# Patient Record
Sex: FEMALE | URBAN - METROPOLITAN AREA
[De-identification: names, ages, dates, MRNs, and addresses within clinical notes are randomized per-mention and may not be internally consistent; named-entity substitution may affect disease eponyms.]

---

## 2021-02-24 ENCOUNTER — OFFICE VISIT (OUTPATIENT)
Dept: URBAN - METROPOLITAN AREA CLINIC 91 | Facility: CLINIC | Age: 76
End: 2021-02-24
Payer: MEDICARE

## 2021-02-24 DIAGNOSIS — H04.123 DRY EYE SYNDROME OF BILATERAL LACRIMAL GLANDS: ICD-10-CM

## 2021-02-24 DIAGNOSIS — H43.811 VITREOUS DEGENERATION, RIGHT EYE: ICD-10-CM

## 2021-02-24 DIAGNOSIS — H26.493 OTHER SECONDARY CATARACT, BILATERAL: ICD-10-CM

## 2021-02-24 DIAGNOSIS — E11.3293 TYPE 2 DIAB WITH MILD NONP RTNOP WITHOUT MACULAR EDEMA, BI: Primary | ICD-10-CM

## 2021-02-24 DIAGNOSIS — H35.371 PUCKERING OF MACULA, RIGHT EYE: ICD-10-CM

## 2021-02-24 DIAGNOSIS — H02.831 DERMATOCHALASIS OF RIGHT UPPER EYELID: ICD-10-CM

## 2021-02-24 DIAGNOSIS — H02.834 DERMATOCHALASIS OF LEFT UPPER EYELID: ICD-10-CM

## 2021-02-24 PROCEDURE — 99214 OFFICE O/P EST MOD 30 MIN: CPT | Performed by: OPHTHALMOLOGY

## 2021-02-24 RX ORDER — ATENOLOL 100 MG/1
100 MG TABLET ORAL
Refills: 0 | Status: ACTIVE
Start: 2021-02-24

## 2021-02-24 RX ORDER — METFORMIN HYDROCHLORIDE 500 MG/1
500 MG TABLET, FILM COATED ORAL
Refills: 0 | Status: ACTIVE
Start: 2021-02-24

## 2021-02-24 RX ORDER — AMLODIPINE BESYLATE 5 MG/1
5 MG TABLET ORAL
Qty: 0 | Refills: 0 | Status: ACTIVE
Start: 2021-02-24

## 2021-02-24 RX ORDER — SIMVASTATIN 10 MG/1
10 MG TABLET, FILM COATED ORAL
Refills: 0 | Status: ACTIVE
Start: 2021-02-24

## 2021-02-24 RX ORDER — GLIPIZIDE 2.5 MG/1
2.5 MG TABLET, FILM COATED, EXTENDED RELEASE ORAL
Qty: 0 | Refills: 0 | Status: ACTIVE
Start: 2021-02-24

## 2021-02-24 ASSESSMENT — INTRAOCULAR PRESSURE
OS: 14
OD: 14

## 2021-02-24 NOTE — IMPRESSION/PLAN
Impression: Dermatochalasis of right upper eyelid: H02.831. Plan: see notes above. Patient to switch to lopressor 50 mg bid for 1 month, while atenolol is out of stock. New Rx sent. Thanks.

## 2021-02-24 NOTE — IMPRESSION/PLAN
Impression: Vitreous degeneration, right eye: H43.981. Plan: For posterior vitreous detachment, I have explained signs and symptoms associated with retinal tear or detachment. Patient was advised to return ASAP if they notice an increase or change in floaters, change or decrease in vision or a veil or curtain coming down over their vision. I have explained the risk of vision loss, and possible need for retinal detachment repair in detail. Patient was also provided with an informational pamphlet.

## 2021-02-24 NOTE — IMPRESSION/PLAN
Impression: Puckering of macula, right eye: H35.371. Plan: I discussed epiretinal membrane in right eye. I will obtain baseline testing at this time. Patient advised to monitor vision closely, and to call our office immediately if change is noted.

## 2021-02-24 NOTE — IMPRESSION/PLAN
Impression: Dermatochalasis of left upper eyelid: H02.834. Plan: Drooping upper eyelids were discussed with patient. Patient would like to proceed.

## 2021-02-24 NOTE — IMPRESSION/PLAN
Impression: Type 2 diab with mild nonp rtnop without macular edema, bi: L34.2921. Plan: I have explained to patient that there is evidence of mild diabetic retinopathy in both eyes. I have stressed the importance of patient maintaining good blood sugar control, and patient understands the need for close follow-up. Patient will return for regular follow-up appointments with repeat dilation.

## 2021-03-19 ENCOUNTER — OFFICE VISIT (OUTPATIENT)
Dept: URBAN - METROPOLITAN AREA CLINIC 91 | Facility: CLINIC | Age: 76
End: 2021-03-19
Payer: MEDICARE

## 2021-03-19 PROCEDURE — 99212 OFFICE O/P EST SF 10 MIN: CPT | Performed by: OPHTHALMOLOGY

## 2021-03-19 PROCEDURE — 92285 EXTERNAL OCULAR PHOTOGRAPHY: CPT | Performed by: OPHTHALMOLOGY

## 2021-03-19 PROCEDURE — 92081 LIMITED VISUAL FIELD XM: CPT | Performed by: OPHTHALMOLOGY

## 2021-03-19 NOTE — IMPRESSION/PLAN
Impression: Dermatochalasis of left upper eyelid: H02.834. Plan: Drooping upper eyelids were discussed with patient. The patient feels that upper lids are affecting their field of vision or daily life and would like to discuss surgery intervention. Risks, Benefits, and Alternatives explained to the patient including bleeding, scarring, asymmetric lids, bruising, loss of eye or vision, worsening of dry eye condition, poor cosmesis, and need for further surgery. Patient understands that they should be off blood thinner for 10-14 days prior to surgery. Patient agrees with all of these risks and the patient has opted to undergo surgery. After reviewing field test and external photos, dermatochalasis affects superior 40 degrees of vision and it improves with taping of the lids.  Due to the fact that testing shows improvement in fields of vision,  I recommended that patient move forward with blepharoplasty

## 2021-03-19 NOTE — IMPRESSION/PLAN
Impression: Dermatochalasis of right upper eyelid: H02.831. Plan: see plan above. Number Of Freeze-Thaw Cycles: 1 freeze-thaw cycle Detail Level: Detailed Render Post-Care Instructions In Note?: no Duration Of Freeze Thaw-Cycle (Seconds): 5